# Patient Record
Sex: FEMALE | Race: WHITE | NOT HISPANIC OR LATINO | Employment: FULL TIME | URBAN - METROPOLITAN AREA
[De-identification: names, ages, dates, MRNs, and addresses within clinical notes are randomized per-mention and may not be internally consistent; named-entity substitution may affect disease eponyms.]

---

## 2018-07-12 ASSESSMENT — MIFFLIN-ST. JEOR: SCORE: 1446.11

## 2018-07-13 ENCOUNTER — SURGERY - HEALTHEAST (OUTPATIENT)
Dept: SURGERY | Facility: HOSPITAL | Age: 33
End: 2018-07-13

## 2018-07-13 ENCOUNTER — ANESTHESIA - HEALTHEAST (OUTPATIENT)
Dept: SURGERY | Facility: HOSPITAL | Age: 33
End: 2018-07-13

## 2018-12-22 ENCOUNTER — ANESTHESIA - HEALTHEAST (OUTPATIENT)
Dept: SURGERY | Facility: HOSPITAL | Age: 33
End: 2018-12-22

## 2018-12-22 ENCOUNTER — SURGERY - HEALTHEAST (OUTPATIENT)
Dept: SURGERY | Facility: HOSPITAL | Age: 33
End: 2018-12-22

## 2018-12-22 ASSESSMENT — MIFFLIN-ST. JEOR: SCORE: 1482.4

## 2019-12-16 ENCOUNTER — RECORDS - HEALTHEAST (OUTPATIENT)
Dept: ADMINISTRATIVE | Facility: OTHER | Age: 34
End: 2019-12-16

## 2019-12-18 ENCOUNTER — RECORDS - HEALTHEAST (OUTPATIENT)
Dept: ADMINISTRATIVE | Facility: OTHER | Age: 34
End: 2019-12-18

## 2020-01-07 ENCOUNTER — RECORDS - HEALTHEAST (OUTPATIENT)
Dept: ADMINISTRATIVE | Facility: OTHER | Age: 35
End: 2020-01-07

## 2020-01-11 ENCOUNTER — RECORDS - HEALTHEAST (OUTPATIENT)
Dept: ADMINISTRATIVE | Facility: OTHER | Age: 35
End: 2020-01-11

## 2021-04-24 LAB
ABO (EXTERNAL): NORMAL
HEMOGLOBIN (EXTERNAL): 12.9 G/DL (ref 11.7–15.5)
HEPATITIS B SURFACE ANTIGEN (EXTERNAL): NEGATIVE
HIV1+2 AB SERPL QL IA: NEGATIVE
PLATELET COUNT (EXTERNAL): 271 10E3/UL (ref 140–400)
RH (EXTERNAL): POSITIVE
RUBELLA ANTIBODY IGG (EXTERNAL): NORMAL
TREPONEMA PALLIDUM ANTIBODY (EXTERNAL): NONREACTIVE
VDRL (SYPHILIS) (EXTERNAL): NEGATIVE

## 2021-06-01 VITALS — HEIGHT: 64 IN | WEIGHT: 170 LBS | BODY MASS INDEX: 29.02 KG/M2

## 2021-06-02 VITALS — BODY MASS INDEX: 30.39 KG/M2 | WEIGHT: 178 LBS | HEIGHT: 64 IN

## 2021-06-16 PROBLEM — Z34.90 PREGNANT: Status: ACTIVE | Noted: 2020-01-12

## 2021-06-19 NOTE — ANESTHESIA POSTPROCEDURE EVALUATION
Patient: Ilaina Eldridge  LAPAROSCOPY WITH A TUBAL DYE STUDY,  LYSIS  OF ADHESIONS FULGURATION OF ENDOMETRIOSIS  Anesthesia type: general    Patient location: PACU  Last vitals:   Vitals:    07/13/18 1600   BP:    Pulse:    Resp: 14   Temp:    SpO2:      Post vital signs: stable  Level of consciousness: awake and responds to simple questions  Post-anesthesia pain: pain controlled  Post-anesthesia nausea and vomiting: no  Pulmonary: unassisted, return to baseline  Cardiovascular: stable and blood pressure at baseline  Hydration: adequate  Anesthetic events: no    QCDR Measures:  ASA# 11 - María-op Cardiac Arrest: ASA11B - Patient did NOT experience unanticipated cardiac arrest  ASA# 12 - María-op Mortality Rate: ASA12B - Patient did NOT die  ASA# 13 - PACU Re-Intubation Rate: ASA13B - Patient did NOT require a new airway mgmt  ASA# 10 - Composite Anes Safety: ASA10A - No serious adverse event    Additional Notes:

## 2021-06-19 NOTE — ANESTHESIA CARE TRANSFER NOTE
Last vitals:   Vitals:    07/13/18 1541   BP: 119/82   Pulse: 89   Resp: 20   Temp: 36.9  C (98.4  F)   SpO2: 100%     Volatile agents turned off, muscle relaxant reversed, 4/4 twitches with sustained tetany. Pt breathing spontaneously with adequate tidal volumes, following commands, gently suctioned oropharynx, extubated without issue. Transported by CRNA, SRNA and RN to recovery.    Patient's level of consciousness is drowsy  Spontaneous respirations: yes  Maintains airway independently: yes  Dentition unchanged: yes  Oropharynx: oropharynx clear of all foreign objects    QCDR Measures:  ASA# 20 - Surgical Safety Checklist: WHO surgical safety checklist completed prior to induction  PQRS# 430 - Adult PONV Prevention: 4558F - Pt received => 2 anti-emetic agents (different classes) preop & intraop  ASA# 8 - Peds PONV Prevention: NA - Not pediatric patient, not GA or 2 or more risk factors NOT present  PQRS# 424 - María-op Temp Management: 4559F - At least one body temp DOCUMENTED => 35.5C or 95.9F within required timeframe  PQRS# 426 - PACU Transfer Protocol: - Transfer of care checklist used  ASA# 14 - Acute Post-op Pain: ASA14B - Patient did NOT experience pain >= 7 out of 10

## 2021-06-19 NOTE — ANESTHESIA PREPROCEDURE EVALUATION
Anesthesia Evaluation      Patient summary reviewed   No history of anesthetic complications     Airway   Mallampati: II  Neck ROM: full   Pulmonary - negative ROS and normal exam    breath sounds clear to auscultation  (-) sleep apnea, not a smoker                         Cardiovascular - negative ROS  (-) murmur  Rhythm: regular  Rate: normal,    no murmur      Neuro/Psych - negative ROS     Endo/Other    (-) not pregnant     Comments: PCOS  dysmenorrhea    GI/Hepatic/Renal - negative ROS           Dental - normal exam                        Anesthesia Plan  Planned anesthetic: general endotracheal    ASA 2   Induction: intravenous   Anesthetic plan and risks discussed with: patient and spouse  Anesthesia plan special considerations: antiemetics,   Post-op plan: routine recovery

## 2021-06-22 NOTE — ANESTHESIA POSTPROCEDURE EVALUATION
Patient: Iliana Eldridge  DILATION AND CURETTAGE, UTERUS, USING SUCTION  Anesthesia type: MAC    Patient location: PACU  Last vitals:   Vitals:    12/22/18 0930   BP: 125/72   Pulse: 75   Resp: 16   Temp:    SpO2: 98%     Post vital signs: stable  Level of consciousness: awake and responds to simple questions  Post-anesthesia pain: pain controlled  Post-anesthesia nausea and vomiting: no  Pulmonary: unassisted, return to baseline  Cardiovascular: stable and blood pressure at baseline  Hydration: adequate  Anesthetic events: no    QCDR Measures:  ASA# 11 - María-op Cardiac Arrest: ASA11B - Patient did NOT experience unanticipated cardiac arrest  ASA# 12 - María-op Mortality Rate: ASA12B - Patient did NOT die  ASA# 13 - PACU Re-Intubation Rate: ASA13B - Patient did NOT require a new airway mgmt  ASA# 10 - Composite Anes Safety: ASA10A - No serious adverse event    Additional Notes:

## 2021-06-22 NOTE — ANESTHESIA CARE TRANSFER NOTE
Last vitals:   Vitals:    12/22/18 0844   BP: 109/55   Pulse: 94   Resp: 16   Temp: 37.4  C (99.4  F)   SpO2: 98%     Patient's level of consciousness is awake  Spontaneous respirations: yes  Maintains airway independently: yes  Dentition unchanged: yes  Oropharynx: oropharynx clear of all foreign objects    QCDR Measures:  ASA# 20 - Surgical Safety Checklist: WHO surgical safety checklist completed prior to induction    PQRS# 430 - Adult PONV Prevention: 4558F - Pt received => 2 anti-emetic agents (different classes) preop & intraop  ASA# 8 - Peds PONV Prevention: NA - Not pediatric patient, not GA or 2 or more risk factors NOT present  PQRS# 424 - María-op Temp Management: 4559F - At least one body temp DOCUMENTED => 35.5C or 95.9F within required timeframe  PQRS# 426 - PACU Transfer Protocol: - Transfer of care checklist used  ASA# 14 - Acute Post-op Pain: ASA14B - Patient did NOT experience pain >= 7 out of 10

## 2021-06-22 NOTE — ANESTHESIA PREPROCEDURE EVALUATION
Anesthesia Evaluation      Patient summary reviewed   No history of anesthetic complications     Airway   Mallampati: II  Neck ROM: full   Pulmonary - negative ROS and normal exam    breath sounds clear to auscultation  (-) sleep apnea, not a smoker                         Cardiovascular - negative ROS  (-) murmur  Rhythm: regular  Rate: normal,    no murmur      Neuro/Psych - negative ROS     Endo/Other    (-) not pregnant     Comments: PCOS  dysmenorrhea    GI/Hepatic/Renal - negative ROS           Dental - normal exam                          Anesthesia Plan  Planned anesthetic: MAC    ASA 2 - emergent   Induction: intravenous   Anesthetic plan and risks discussed with: patient  Anesthesia plan special considerations: antiemetics,   Post-op plan: routine recovery

## 2021-07-03 NOTE — ADDENDUM NOTE
Addendum Note by Tigist Wharton MD at 7/13/2018  4:35 PM     Author: Tigist Wharton MD Service: -- Author Type: Physician    Filed: 7/13/2018  4:35 PM Date of Service: 7/13/2018  4:35 PM Status: Signed    : Tigist Wharton MD (Physician)       Addendum  created 07/13/18 1635 by Tigist Wharton MD    Sign clinical note

## 2021-07-03 NOTE — ADDENDUM NOTE
Addendum Note by Lizzy Smith CRNA at 12/22/2018 10:35 AM     Author: Lizzy Smith CRNA Service: -- Author Type: Nurse Anesthetist    Filed: 12/22/2018 10:35 AM Date of Service: 12/22/2018 10:35 AM Status: Signed    : Lizzy Smith CRNA (Nurse Anesthetist)       Addendum  created 12/22/18 1035 by Lizzy Smith CRNA    Intraprocedure Flowsheets edited

## 2021-07-03 NOTE — ADDENDUM NOTE
Addendum Note by Lizzy Smith CRNA at 12/22/2018 10:34 AM     Author: Lizzy Smith CRNA Service: -- Author Type: Nurse Anesthetist    Filed: 12/22/2018 10:34 AM Date of Service: 12/22/2018 10:34 AM Status: Signed    : Lizzy Smith CRNA (Nurse Anesthetist)       Addendum  created 12/22/18 1034 by Lizzy Smith CRNA    Intraprocedure Flowsheets edited

## 2021-08-23 ENCOUNTER — TRANSFERRED RECORDS (OUTPATIENT)
Dept: HEALTH INFORMATION MANAGEMENT | Facility: CLINIC | Age: 36
End: 2021-08-23
Payer: COMMERCIAL

## 2021-10-22 LAB — GROUP B STREPTOCOCCUS (EXTERNAL): POSITIVE

## 2021-10-28 ENCOUNTER — TRANSFERRED RECORDS (OUTPATIENT)
Dept: HEALTH INFORMATION MANAGEMENT | Facility: CLINIC | Age: 36
End: 2021-10-28

## 2021-11-19 ENCOUNTER — HOSPITAL ENCOUNTER (INPATIENT)
Facility: HOSPITAL | Age: 36
LOS: 3 days | Discharge: HOME | End: 2021-11-22
Attending: OBSTETRICS & GYNECOLOGY | Admitting: OBSTETRICS & GYNECOLOGY
Payer: COMMERCIAL

## 2021-11-19 PROBLEM — Z34.90 PREGNANCY: Status: ACTIVE | Noted: 2021-11-19

## 2021-11-19 PROCEDURE — 120N000001 HC R&B MED SURG/OB

## 2021-11-19 RX ORDER — OXYTOCIN 10 [USP'U]/ML
10 INJECTION, SOLUTION INTRAMUSCULAR; INTRAVENOUS
Status: DISCONTINUED | OUTPATIENT
Start: 2021-11-19 | End: 2021-11-22 | Stop reason: HOSPADM

## 2021-11-19 RX ORDER — OXYTOCIN/0.9 % SODIUM CHLORIDE 30/500 ML
340 PLASTIC BAG, INJECTION (ML) INTRAVENOUS CONTINUOUS PRN
Status: DISCONTINUED | OUTPATIENT
Start: 2021-11-19 | End: 2021-11-20 | Stop reason: HOSPADM

## 2021-11-19 RX ORDER — OXYTOCIN/0.9 % SODIUM CHLORIDE 30/500 ML
100-340 PLASTIC BAG, INJECTION (ML) INTRAVENOUS CONTINUOUS PRN
Status: DISCONTINUED | OUTPATIENT
Start: 2021-11-19 | End: 2021-11-22 | Stop reason: HOSPADM

## 2021-11-19 RX ORDER — ONDANSETRON 4 MG/1
4 TABLET, ORALLY DISINTEGRATING ORAL EVERY 6 HOURS PRN
Status: DISCONTINUED | OUTPATIENT
Start: 2021-11-19 | End: 2021-11-20 | Stop reason: HOSPADM

## 2021-11-19 RX ORDER — CARBOPROST TROMETHAMINE 250 UG/ML
250 INJECTION, SOLUTION INTRAMUSCULAR
Status: DISCONTINUED | OUTPATIENT
Start: 2021-11-19 | End: 2021-11-20 | Stop reason: HOSPADM

## 2021-11-19 RX ORDER — ONDANSETRON 2 MG/ML
4 INJECTION INTRAMUSCULAR; INTRAVENOUS EVERY 6 HOURS PRN
Status: DISCONTINUED | OUTPATIENT
Start: 2021-11-19 | End: 2021-11-20 | Stop reason: HOSPADM

## 2021-11-19 RX ORDER — PENICILLIN G POTASSIUM 5000000 [IU]/1
5 INJECTION, POWDER, FOR SOLUTION INTRAMUSCULAR; INTRAVENOUS ONCE
Status: COMPLETED | OUTPATIENT
Start: 2021-11-20 | End: 2021-11-20

## 2021-11-19 RX ORDER — NALOXONE HYDROCHLORIDE 0.4 MG/ML
0.4 INJECTION, SOLUTION INTRAMUSCULAR; INTRAVENOUS; SUBCUTANEOUS
Status: DISCONTINUED | OUTPATIENT
Start: 2021-11-19 | End: 2021-11-20 | Stop reason: HOSPADM

## 2021-11-19 RX ORDER — NALOXONE HYDROCHLORIDE 0.4 MG/ML
0.2 INJECTION, SOLUTION INTRAMUSCULAR; INTRAVENOUS; SUBCUTANEOUS
Status: DISCONTINUED | OUTPATIENT
Start: 2021-11-19 | End: 2021-11-20 | Stop reason: HOSPADM

## 2021-11-19 RX ORDER — LIDOCAINE 40 MG/G
CREAM TOPICAL
Status: DISCONTINUED | OUTPATIENT
Start: 2021-11-19 | End: 2021-11-20 | Stop reason: HOSPADM

## 2021-11-19 RX ORDER — PENICILLIN G 3000000 [IU]/50ML
3 INJECTION, SOLUTION INTRAVENOUS EVERY 4 HOURS
Status: DISCONTINUED | OUTPATIENT
Start: 2021-11-20 | End: 2021-11-20 | Stop reason: HOSPADM

## 2021-11-19 RX ORDER — IBUPROFEN 600 MG/1
600 TABLET, FILM COATED ORAL
Status: DISCONTINUED | OUTPATIENT
Start: 2021-11-19 | End: 2021-11-22 | Stop reason: HOSPADM

## 2021-11-19 RX ORDER — KETOROLAC TROMETHAMINE 30 MG/ML
30 INJECTION, SOLUTION INTRAMUSCULAR; INTRAVENOUS
Status: DISCONTINUED | OUTPATIENT
Start: 2021-11-19 | End: 2021-11-22 | Stop reason: HOSPADM

## 2021-11-19 RX ORDER — OXYTOCIN 10 [USP'U]/ML
10 INJECTION, SOLUTION INTRAMUSCULAR; INTRAVENOUS
Status: DISCONTINUED | OUTPATIENT
Start: 2021-11-19 | End: 2021-11-20 | Stop reason: HOSPADM

## 2021-11-19 RX ORDER — FENTANYL CITRATE 50 UG/ML
50-100 INJECTION, SOLUTION INTRAMUSCULAR; INTRAVENOUS
Status: DISCONTINUED | OUTPATIENT
Start: 2021-11-19 | End: 2021-11-20 | Stop reason: HOSPADM

## 2021-11-19 ASSESSMENT — MIFFLIN-ST. JEOR: SCORE: 1574.93

## 2021-11-20 LAB
ABO/RH(D): NORMAL
ANTIBODY SCREEN: NEGATIVE
ERYTHROCYTE [DISTWIDTH] IN BLOOD BY AUTOMATED COUNT: 13.1 % (ref 10–15)
HCT VFR BLD AUTO: 38.9 % (ref 35–47)
HGB BLD-MCNC: 13.5 G/DL (ref 11.7–15.7)
MCH RBC QN AUTO: 32.1 PG (ref 26.5–33)
MCHC RBC AUTO-ENTMCNC: 34.7 G/DL (ref 31.5–36.5)
MCV RBC AUTO: 92 FL (ref 78–100)
PLATELET # BLD AUTO: 170 10E3/UL (ref 150–450)
RBC # BLD AUTO: 4.21 10E6/UL (ref 3.8–5.2)
SARS-COV-2 RNA RESP QL NAA+PROBE: NEGATIVE
SPECIMEN EXPIRATION DATE: NORMAL
WBC # BLD AUTO: 11.2 10E3/UL (ref 4–11)

## 2021-11-20 PROCEDURE — 87635 SARS-COV-2 COVID-19 AMP PRB: CPT | Performed by: OBSTETRICS & GYNECOLOGY

## 2021-11-20 PROCEDURE — 36415 COLL VENOUS BLD VENIPUNCTURE: CPT | Performed by: OBSTETRICS & GYNECOLOGY

## 2021-11-20 PROCEDURE — 722N000001 HC LABOR CARE VAGINAL DELIVERY SINGLE

## 2021-11-20 PROCEDURE — 120N000001 HC R&B MED SURG/OB

## 2021-11-20 PROCEDURE — 250N000011 HC RX IP 250 OP 636: Performed by: OBSTETRICS & GYNECOLOGY

## 2021-11-20 PROCEDURE — 258N000003 HC RX IP 258 OP 636: Performed by: OBSTETRICS & GYNECOLOGY

## 2021-11-20 PROCEDURE — 85027 COMPLETE CBC AUTOMATED: CPT | Performed by: OBSTETRICS & GYNECOLOGY

## 2021-11-20 PROCEDURE — 86900 BLOOD TYPING SEROLOGIC ABO: CPT | Performed by: OBSTETRICS & GYNECOLOGY

## 2021-11-20 PROCEDURE — 250N000009 HC RX 250: Performed by: OBSTETRICS & GYNECOLOGY

## 2021-11-20 PROCEDURE — 86780 TREPONEMA PALLIDUM: CPT | Performed by: OBSTETRICS & GYNECOLOGY

## 2021-11-20 PROCEDURE — 250N000013 HC RX MED GY IP 250 OP 250 PS 637: Performed by: OBSTETRICS & GYNECOLOGY

## 2021-11-20 PROCEDURE — 0KQM0ZZ REPAIR PERINEUM MUSCLE, OPEN APPROACH: ICD-10-PCS | Performed by: OBSTETRICS & GYNECOLOGY

## 2021-11-20 RX ORDER — ACYCLOVIR 400 MG/1
TABLET ORAL
COMMUNITY
Start: 2021-10-21

## 2021-11-20 RX ORDER — MODIFIED LANOLIN
OINTMENT (GRAM) TOPICAL
Status: DISCONTINUED | OUTPATIENT
Start: 2021-11-20 | End: 2021-11-22 | Stop reason: HOSPADM

## 2021-11-20 RX ORDER — MISOPROSTOL 200 UG/1
TABLET ORAL
Status: DISPENSED
Start: 2021-11-20 | End: 2021-11-20

## 2021-11-20 RX ORDER — LIDOCAINE HYDROCHLORIDE 10 MG/ML
INJECTION, SOLUTION EPIDURAL; INFILTRATION; INTRACAUDAL; PERINEURAL
Status: DISPENSED
Start: 2021-11-20 | End: 2021-11-20

## 2021-11-20 RX ORDER — CHLORAL HYDRATE 500 MG
1 CAPSULE ORAL DAILY
COMMUNITY

## 2021-11-20 RX ORDER — IBUPROFEN 800 MG/1
800 TABLET, FILM COATED ORAL EVERY 6 HOURS PRN
Status: DISCONTINUED | OUTPATIENT
Start: 2021-11-20 | End: 2021-11-22 | Stop reason: HOSPADM

## 2021-11-20 RX ORDER — HYDROCORTISONE 2.5 %
CREAM (GRAM) TOPICAL 3 TIMES DAILY PRN
Status: DISCONTINUED | OUTPATIENT
Start: 2021-11-20 | End: 2021-11-22 | Stop reason: HOSPADM

## 2021-11-20 RX ORDER — HEPARIN SODIUM 5000 [USP'U]/ML
INJECTION, SOLUTION INTRAVENOUS; SUBCUTANEOUS
Status: ON HOLD | COMMUNITY
Start: 2021-10-21 | End: 2021-11-22

## 2021-11-20 RX ORDER — DOCUSATE SODIUM 100 MG/1
100 CAPSULE, LIQUID FILLED ORAL DAILY
Status: DISCONTINUED | OUTPATIENT
Start: 2021-11-20 | End: 2021-11-22 | Stop reason: HOSPADM

## 2021-11-20 RX ORDER — OXYTOCIN 10 [USP'U]/ML
INJECTION, SOLUTION INTRAMUSCULAR; INTRAVENOUS
Status: DISPENSED
Start: 2021-11-20 | End: 2021-11-20

## 2021-11-20 RX ORDER — BISACODYL 10 MG
10 SUPPOSITORY, RECTAL RECTAL DAILY PRN
Status: DISCONTINUED | OUTPATIENT
Start: 2021-11-20 | End: 2021-11-22 | Stop reason: HOSPADM

## 2021-11-20 RX ORDER — LIDOCAINE HYDROCHLORIDE 20 MG/ML
SOLUTION OROPHARYNGEAL
Status: DISPENSED
Start: 2021-11-20 | End: 2021-11-20

## 2021-11-20 RX ORDER — ACETAMINOPHEN 325 MG/1
650 TABLET ORAL EVERY 4 HOURS PRN
Status: DISCONTINUED | OUTPATIENT
Start: 2021-11-20 | End: 2021-11-22 | Stop reason: HOSPADM

## 2021-11-20 RX ADMIN — IBUPROFEN 800 MG: 800 TABLET, FILM COATED ORAL at 09:41

## 2021-11-20 RX ADMIN — LIDOCAINE HYDROCHLORIDE 20 ML: 10 INJECTION, SOLUTION EPIDURAL; INFILTRATION; INTRACAUDAL; PERINEURAL at 01:41

## 2021-11-20 RX ADMIN — PENICILLIN G POTASSIUM 5 MILLION UNITS: 5000000 POWDER, FOR SOLUTION INTRAMUSCULAR; INTRAPLEURAL; INTRATHECAL; INTRAVENOUS at 00:10

## 2021-11-20 RX ADMIN — DOCUSATE SODIUM 100 MG: 100 CAPSULE, LIQUID FILLED ORAL at 09:41

## 2021-11-20 RX ADMIN — IBUPROFEN 800 MG: 800 TABLET, FILM COATED ORAL at 15:12

## 2021-11-20 RX ADMIN — BENZOCAINE AND LEVOMENTHOL: 200; 5 SPRAY TOPICAL at 04:18

## 2021-11-20 RX ADMIN — IBUPROFEN 800 MG: 800 TABLET, FILM COATED ORAL at 21:32

## 2021-11-20 RX ADMIN — SODIUM CHLORIDE, POTASSIUM CHLORIDE, SODIUM LACTATE AND CALCIUM CHLORIDE 1000 ML: 600; 310; 30; 20 INJECTION, SOLUTION INTRAVENOUS at 00:07

## 2021-11-20 RX ADMIN — Medication: at 04:19

## 2021-11-20 RX ADMIN — KETOROLAC TROMETHAMINE 30 MG: 30 INJECTION, SOLUTION INTRAMUSCULAR at 03:25

## 2021-11-20 ASSESSMENT — ACTIVITIES OF DAILY LIVING (ADL)
HEARING_DIFFICULTY_OR_DEAF: NO
WEAR_GLASSES_OR_BLIND: YES
TOILETING_ISSUES: NO
WALKING_OR_CLIMBING_STAIRS_DIFFICULTY: NO
DRESSING/BATHING_DIFFICULTY: NO
FALL_HISTORY_WITHIN_LAST_SIX_MONTHS: NO
DOING_ERRANDS_INDEPENDENTLY_DIFFICULTY: NO
PATIENT_/_FAMILY_COMMUNICATION_STYLE: SPOKEN LANGUAGE (ENGLISH OR BILINGUAL)
DIFFICULTY_COMMUNICATING: NO
DIFFICULTY_EATING/SWALLOWING: NO
CONCENTRATING,_REMEMBERING_OR_MAKING_DECISIONS_DIFFICULTY: NO

## 2021-11-20 NOTE — PLAN OF CARE
Problem: Adult Inpatient Plan of Care  Goal: Plan of Care Review  Outcome: Improving  Goal: Patient-Specific Goal (Individualized)  Outcome: Improving  Goal: Absence of Hospital-Acquired Illness or Injury  Outcome: Improving  Goal: Optimal Comfort and Wellbeing  Outcome: Improving  Goal: Readiness for Transition of Care  Outcome: Improving  Flowsheets (Taken 11/20/2021 0015)  Transportation Anticipated: none  Intervention: Mutually Develop Transition Plan  Recent Flowsheet Documentation  Taken 11/20/2021 0015 by Mariah Matthews, RN  Transportation Anticipated: none  Patient/Family Anticipated Services at Transition: none  Patient/Family Anticipates Transition to: home  Taken 11/20/2021 0000 by Mariah Matthews, RN  Equipment Currently Used at Home: none

## 2021-11-20 NOTE — PLAN OF CARE
Problem: Pain (Postpartum Vaginal Delivery)  Goal: Acceptable Pain Control  Outcome: Improving  Intervention: Prevent or Manage Pain  Recent Flowsheet Documentation  Taken 11/20/2021 0800 by Emeli Cheney RN  Complementary Therapy: aromatherapy utilized     VSS.  PP assessments WDL.  Ibuprofen for discomfort.  Independent with self and infant cares.  Encouraged to bring infant to breast ALD q 2-3 hours even if its just an attempt.  Verbalized understanding.

## 2021-11-20 NOTE — PLAN OF CARE
Problem: Adult Inpatient Plan of Care  Goal: Plan of Care Review  11/20/2021 0101 by Mariah Matthews RN  Outcome: Improving  11/20/2021 0059 by Mariah Matthews RN  Outcome: Improving  Goal: Patient-Specific Goal (Individualized)  11/20/2021 0101 by Mariah Matthews RN  Outcome: Improving  11/20/2021 0059 by Mariah Matthews RN  Outcome: Improving  Goal: Absence of Hospital-Acquired Illness or Injury  11/20/2021 0101 by Mariah Matthews RN  Outcome: Improving  11/20/2021 0059 by Mariah Matthews RN  Outcome: Improving  Goal: Optimal Comfort and Wellbeing  11/20/2021 0101 by Mariah Matthews RN  Outcome: Improving  11/20/2021 0059 by Mariah Matthews RN  Outcome: Improving  Goal: Readiness for Transition of Care  11/20/2021 0101 by Mariah Matthews RN  Outcome: Improving  11/20/2021 0059 by Mariah Matthews RN  Outcome: Improving  Flowsheets (Taken 11/20/2021 0015)  Transportation Anticipated: none  Intervention: Mutually Develop Transition Plan  Recent Flowsheet Documentation  Taken 11/20/2021 0015 by Mariah Matthews RN  Transportation Anticipated: none  Patient/Family Anticipated Services at Transition: none  Patient/Family Anticipates Transition to: home  Taken 11/20/2021 0000 by Mariah Matthews RN  Equipment Currently Used at Home: none     Problem: Bleeding (Labor)  Goal: Hemostasis  Outcome: Improving     Problem: Change in Fetal Wellbeing (Labor)  Goal: Stable Fetal Wellbeing  Outcome: Improving     Problem: Delayed Labor Progression (Labor)  Goal: Effective Progression to Delivery  Outcome: Improving     Problem: Infection (Labor)  Goal: Absence of Infection Signs and Symptoms  Outcome: Improving     Problem: Labor Pain (Labor)  Goal: Acceptable Pain Control  Outcome: Improving     Problem: Uterine Tachysystole (Labor)  Goal: Normal Uterine Contraction Pattern  Outcome: Improving

## 2021-11-20 NOTE — LACTATION NOTE
This note was copied from a baby's chart.  Lactation consultant to patient room to assess breastfeeding (history, goals, & current experience). 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 Breast fed her first children, now 6 ys and 2 yrs, for 2 years and 15 months respectively. States this baby has been very sleepy since delivery.    Reviewed benefit of skin to skin prior to feeding to help get baby ready for feeding, importance of feeding baby on early hunger cues, and breastfeeding 8-12 times in 24 hours for optimal infant nutrition and hydration as well as for building an optimal milk supply.  Education given regarding importance of optimal positioning for deep, comfortable latch and effective milk transfer.     Instructed on hand expression and breast compression. Anticipatory guidance given on cluster feeding, engorgement, and pumping. Reviewed online and outpatient lactation resources for breastfeeding help after discharge.     Answered questions and gave encouragement.

## 2021-11-20 NOTE — H&P
11-19-21 Iliana Eldridge  [birth year]    CC: Leaking fluid and labor    HPI: Pt is a 36 y.o. , spontaneous loss 3 female at 39w 6d who presents with contractions q3m and leaking fluid. She is + for factor 5 leiden and has recurrent pregnancy loss and was on Lovenox 40mg subcutaneous daily, switched to heparin 5000U subcutaneous bid at 36 weeks. Her last heparin was 17 hours ago. She had a C/S for a breech in  and a successful  in . She requests .    PMH:She denies medical problems except infertility with l'scope . She has a PH of genital herpes as well and take acyclovir 400mg bid for suppression. She has not drug allergies and also takes ASA 81mg daily.    PFSH: She is  and does not smoke and does not drink alcohol when pregnant.    ROS: Non-contributory.    PE:VS Not yet charted.  General appearance: WD, WN white female with a gravid uterus and contraction q3m.  Lungs; Clear.  Heart: NSR without murmus.  Abd: Gravid uterus with FH 40 cm at her last PNV. FHR tracing appears to be category 2 with limited reactivity and mild decelerations with contractions.  Cx: 4-5cm, 70%, -1, MP, soft.  Gross ROM with clear fluid.    A: 1. IUP at 39w 6d  2. Previous C/S and previous . Pt desires .  3. AMA  4. Factor 5 leiden  5. PH HSV, on suppression with acyclovir    P: Admit and observe for progress in labor. I expect spontaneous vaginal birth after C/S. CBC. T&S. IV access.    Atul Aly MD    A: 1.

## 2021-11-20 NOTE — PROGRESS NOTES
2312: patient admitted into Room 26 to rule out labor. She states contractions started around 9 pm this evening and her water broke at 10:55 pm. Oriented to room and equipment. Hooked up to [facility] to evaluate.    2335: Dr. Aly notified of patient's arrival to the hospital, grossly ruptured membranes and GBS positive status. He will come to check patient and put orders in.    2340: Digital vaginal exam by Dr. Aly 4-5 cm/70%/-1.    0010: IV started, PCN 5 MU started.    0100: Patient coping well, fetal hearttone category 1 contractions getting closer and stronger.    0123: Digital vaginal exam by Dr. Aly 6/100%/-1 patient complaining of rectal pressure. Encouraged to not push.    0130: Dr. Anderson called back to room as patient is pushing involuntarily.    0131: Dr. Aly and Rissa Yost in room for delivery. .    0134: Spontaneous delivery of a viable baby girl.

## 2021-11-21 LAB — T PALLIDUM AB SER QL: NONREACTIVE

## 2021-11-21 PROCEDURE — 250N000013 HC RX MED GY IP 250 OP 250 PS 637: Performed by: OBSTETRICS & GYNECOLOGY

## 2021-11-21 PROCEDURE — 120N000001 HC R&B MED SURG/OB

## 2021-11-21 RX ADMIN — IBUPROFEN 800 MG: 800 TABLET, FILM COATED ORAL at 22:20

## 2021-11-21 RX ADMIN — IBUPROFEN 800 MG: 800 TABLET, FILM COATED ORAL at 09:43

## 2021-11-21 RX ADMIN — DOCUSATE SODIUM 100 MG: 100 CAPSULE, LIQUID FILLED ORAL at 09:42

## 2021-11-21 RX ADMIN — IBUPROFEN 800 MG: 800 TABLET, FILM COATED ORAL at 16:16

## 2021-11-21 RX ADMIN — IBUPROFEN 800 MG: 800 TABLET, FILM COATED ORAL at 03:12

## 2021-11-21 NOTE — PROGRESS NOTES
11-21-21    S: Feels well.    O: VSS Afebrile    A: PPD #1, doing well.    P: Supportive care.    Atul Aly MD

## 2021-11-21 NOTE — PLAN OF CARE
Problem: Bleeding (Postpartum Vaginal Delivery)  Goal: Hemostasis  Outcome: Improving     Problem: Infection (Postpartum Vaginal Delivery)  Goal: Absence of Infection Signs and Symptoms  Outcome: Improving     Problem: Pain (Postpartum Vaginal Delivery)  Goal: Acceptable Pain Control  Outcome: Improving  Intervention: Prevent or Manage Pain  Recent Flowsheet Documentation  Taken 11/20/2021 1630 by Yeni Bonilla RN  Complementary Therapy: aromatherapy utilized     Problem: Urinary Retention (Postpartum Vaginal Delivery)  Goal: Effective Urinary Elimination  Outcome: Improving     Problem: Breastfeeding  Goal: Effective Breastfeeding  Outcome: Improving  Intervention: Promote Effective Breastfeeding  Recent Flowsheet Documentation  Taken 11/20/2021 1630 by Yeni Bonilla RN  Parent/Child Attachment Promotion:   caring behavior modeled   cue recognition promoted   interaction encouraged   parent/caregiver presence encouraged  Intervention: Support Exclusive Breastfeeding Success  Recent Flowsheet Documentation  Taken 11/20/2021 1630 by Yeni Bonilla RN  Supportive Measures:   active listening utilized   goal-setting facilitated   positive reinforcement provided   self-care encouraged   verbalization of feelings encouraged     VSS, FFU with no clots. Ambulating and voiding independently. Continues to attempt breastfeeding, baby much more alert and responsive after her 1700 feeding. [place] and attentive partner at bedside.

## 2021-11-21 NOTE — PLAN OF CARE
Problem: Breastfeeding  Goal: Effective Breastfeeding  11/21/2021 0207 by Loretta Gonzalez RN  Outcome: Improving  11/21/2021 0206 by Loretta Gonzalez RN  Outcome: Improving  11/21/2021 0206 by Loretta Gonzalez RN  Outcome: Improving  Intervention: Support Exclusive Breastfeeding Success  Recent Flowsheet Documentation  Taken 11/21/2021 0100 by Loretta Gonzalez RN  Supportive Measures:   active listening utilized   goal-setting facilitated   guided imagery facilitated   positive reinforcement provided   problem-solving facilitated   Pt vitals are stable, Mom is exclusively breastfeeding independently well. She is taking Motrin for uterine cramping prn Loretta Gonzalez RN

## 2021-11-21 NOTE — L&D DELIVERY NOTE
21 Iliana Eldridge  83    Delivery Note    Pt is a 36 y.o. G6, now P3 spontaneous loss 3 white female who presented in the late evening hours of 21 at 39w 6d in active labor with [facility]. Her history was significant for a C/S for a breech with her first pregnancy and a successful  with her second. She requested . She tested + for GBS and was given PCN and was + for factor 5 leiden with recurrent pregnancy loss and was on Lovenox to 36 weeks and Heparin 5000U subcutaneous bid, last dose about 18 hours prior to presentation. She took Acyclovir to suppress HSV. She made rapid progress through labor going from 6cm to delivery in three contractions with birth of a 6-15 female with APGAR scores of 8 and 9. The placenta followed spontaneously intact. A second degree perineal laceration was repaired with 3-0 Vicryl suture. EBL was 200cc's. I expect routine PP care.    Atul Aly MD

## 2021-11-21 NOTE — PLAN OF CARE
Spoke with Dr. Aly regarding pt's Factor V and he does not want to any anticoagulation treatments at this time.

## 2021-11-22 VITALS
OXYGEN SATURATION: 97 % | SYSTOLIC BLOOD PRESSURE: 115 MMHG | RESPIRATION RATE: 18 BRPM | HEART RATE: 76 BPM | WEIGHT: 196 LBS | TEMPERATURE: 98.2 F | HEIGHT: 65 IN | BODY MASS INDEX: 32.65 KG/M2 | DIASTOLIC BLOOD PRESSURE: 72 MMHG

## 2021-11-22 PROCEDURE — 250N000013 HC RX MED GY IP 250 OP 250 PS 637: Performed by: OBSTETRICS & GYNECOLOGY

## 2021-11-22 RX ADMIN — IBUPROFEN 800 MG: 800 TABLET, FILM COATED ORAL at 04:10

## 2021-11-22 NOTE — PLAN OF CARE
Problem: Pain (Postpartum Vaginal Delivery)  Goal: Acceptable Pain Control  Outcome: Improving   Mom pain well controlled with NSAIDS.Pt is independent with self and baby cares, meeting discharge goals. Loretta Gonzalez RN

## 2021-11-22 NOTE — PROGRESS NOTES
11-22-21    S: Feels well.    O: VSS Afebrile    A: PPD #2, doing well.    P: Discharge home. Routine instructions. Continue PNV. A Pos. Immune to rubella.    Atul Aly MD

## 2021-11-22 NOTE — PLAN OF CARE
Going-home instructions after a vaginal birth.    Congratulations on the birth of your baby girl!    Please call me if your bleeding is bright red more than a pad an hour and doesn't seem to be slowing down. Please watch for infection by taking your temperature every evening the first week. Please call me if it goes above 100 degrees. If you have stitches, soaking in a warm, soapy bathtub once or twice a day the first week you are home will keep the area clean and help it heal.     You may use Tylenol or Ibuprofen for mild pain. Both are available over the counter. Lanolin for breast feeding, Nupercainal and Tucks for soreness, Colace for a stool softener, and a variety of laxatives are all available over the counter and may be used if needed.     For questions, please call the office at [phone] during regular business hours. My after-hours emergency number is [phone].     Please make an appointment to see me in six weeks.         Atul Aly MD

## 2021-11-22 NOTE — PLAN OF CARE
Iliana's VSS.  She's independent with self and baby cares.  She's taking Ibuprofen for intermittent cramping and perineal pain with good results.    Problem: Breastfeeding  Goal: Effective Breastfeeding  Intervention: Promote Breast Care and Comfort  Recent Flowsheet Documentation  Taken 11/21/2021 1616 by Lauren Brasher RN  Breast Care: hand expression utilized  Intervention: Promote Effective Breastfeeding  Recent Flowsheet Documentation  Taken 11/21/2021 1616 by Lauren Brasher RN  Breastfeeding Assistance:   feeding on demand promoted   feeding cue recognition promoted  Parent/Child Attachment Promotion:   positive reinforcement provided   cue recognition promoted  Intervention: Support Exclusive Breastfeeding Success  Recent Flowsheet Documentation  Taken 11/21/2021 1616 by Lauren Brasher RN  Supportive Measures:   counseling provided   positive reinforcement provided   decision-making supported   Breastfeeding is going well; her milk is in and baby's swallowing audibly at feedings.  Her nipples are comfortable; breasts feeling full, but declines pumping here at hospital.  She plans to pump later when she goes back to work.

## 2024-07-21 ENCOUNTER — HEALTH MAINTENANCE LETTER (OUTPATIENT)
Age: 39
End: 2024-07-21

## 2025-01-10 ENCOUNTER — ANCILLARY PROCEDURE (OUTPATIENT)
Dept: MAMMOGRAPHY | Facility: CLINIC | Age: 40
End: 2025-01-10
Payer: COMMERCIAL

## 2025-01-10 DIAGNOSIS — N63.10 LUMP OF BREAST, RIGHT: ICD-10-CM

## 2025-01-10 DIAGNOSIS — N63.20 LEFT BREAST LUMP: ICD-10-CM

## 2025-01-10 PROCEDURE — 76642 ULTRASOUND BREAST LIMITED: CPT | Mod: LT

## 2025-01-10 PROCEDURE — 77066 DX MAMMO INCL CAD BI: CPT

## 2025-01-15 ENCOUNTER — VIRTUAL VISIT (OUTPATIENT)
Dept: FAMILY MEDICINE | Facility: CLINIC | Age: 40
End: 2025-01-15
Payer: COMMERCIAL

## 2025-01-15 DIAGNOSIS — U07.1 INFECTION DUE TO 2019 NOVEL CORONAVIRUS: Primary | ICD-10-CM

## 2025-01-15 PROCEDURE — 98001 SYNCH AUDIO-VIDEO NEW LOW 30: CPT | Performed by: FAMILY MEDICINE

## 2025-01-15 RX ORDER — VALACYCLOVIR HYDROCHLORIDE 500 MG/1
500 TABLET, FILM COATED ORAL 2 TIMES DAILY
COMMUNITY

## 2025-01-15 NOTE — PROGRESS NOTES
Shavonne is a 39 year old who is being evaluated via a billable video visit.    How would you like to obtain your AVS? MyChart  If the video visit is dropped, the invitation should be resent by: Text to cell phone: [phone]  Will anyone else be joining your video visit? No      Assessment & Plan     Infection due to 2019 novel coronavirus  Patient tested positive for COVID  Patient's collage of symptoms with fevers suspect that she likely also has influenza A  Other sick contacts at home with the children having similar symptoms  She is outside the window for Tamiflu was discussed with her ibuprofen and Tylenol use with hydration  She should be seen in person if symptoms are not improving in the next couple days or if they are worsening                  Subjective   Shavonne is a 39 year old, presenting for the following health issues:  Nasal Congestion (Nasal congestion, sore throat, fever X3 days, tested positive for covid this morning )  Patient is being seen via video visit as a new patient for clinic.  39-year-old female that has been having symptoms of congestion head pressure fevers and sore throat for greater than 3 days in duration.  No other sick contacts at home with her children having similar and other symptoms as well.  This morning she tested positive for COVID but is concerned that there is other things going on.  Patient has been having fevers from 99-1 02-discussed with her to keep temperature down with ibuprofen and Tylenol use and keep well-hydrated.  Being more than 3 days out she is not a candidate for Tamiflu.  We offered a lab visit for swabbing to assess for RSV COVID and influenza-she is not interested in Paxlovid so declines nasal swab.  Patient will continue with home measures.  Recommended Mucinex to help with drainage to help alleviate hopefully some ear pain.  Discussed if symptoms not improving she should be seen in person.      1/15/2025    12:56 PM   Additional Questions   Roomed by  Monique DIEHL CMA     History of Present Illness       Reason for visit:  Fever, body aches, nasal congestion, ear pain, cough  Symptom onset:  1-3 days ago  Symptoms include:  Congestion,  fevers 102+, body aches, cough, ear pain, sore throat  Symptom intensity:  Moderate  Symptom progression:  Worsening  Had these symptoms before:  No  What makes it better:  Rest, hot epsom salt baths, OTC med at night to sleep   She is taking medications regularly.                   Objective           Vitals:  No vitals were obtained today due to virtual visit.    Physical Exam   GENERAL: alert and no distress  EYES: Eyes grossly normal to inspection.  No discharge or erythema, or obvious scleral/conjunctival abnormalities.  RESP: No audible wheeze, cough, or visible cyanosis.    SKIN: Visible skin clear. No significant rash, abnormal pigmentation or lesions.  NEURO: Cranial nerves grossly intact.  Mentation and speech appropriate for age.  PSYCH: Appropriate affect, tone, and pace of words          Video-Visit Details    Type of service:  Video Visit   Originating Location (pt. Location): Home    Distant Location (provider location):  On-site  Platform used for Video Visit: Ainsley  Signed Electronically by: Manuel Spence MD

## 2025-01-22 ENCOUNTER — LAB REQUISITION (OUTPATIENT)
Dept: LAB | Facility: CLINIC | Age: 40
End: 2025-01-22
Payer: COMMERCIAL

## 2025-01-22 DIAGNOSIS — Z01.419 ENCOUNTER FOR GYNECOLOGICAL EXAMINATION (GENERAL) (ROUTINE) WITHOUT ABNORMAL FINDINGS: ICD-10-CM

## 2025-01-22 LAB — HGB BLD-MCNC: 14.3 G/DL (ref 11.7–15.7)

## 2025-01-22 PROCEDURE — 84443 ASSAY THYROID STIM HORMONE: CPT

## 2025-01-22 PROCEDURE — 85018 HEMOGLOBIN: CPT | Mod: ORL

## 2025-01-22 PROCEDURE — 83036 HEMOGLOBIN GLYCOSYLATED A1C: CPT | Mod: ORL

## 2025-01-22 PROCEDURE — 80053 COMPREHEN METABOLIC PANEL: CPT

## 2025-01-22 PROCEDURE — 80061 LIPID PANEL: CPT | Mod: ORL

## 2025-01-22 PROCEDURE — 82310 ASSAY OF CALCIUM: CPT

## 2025-01-22 PROCEDURE — 84155 ASSAY OF PROTEIN SERUM: CPT

## 2025-01-23 LAB
ALBUMIN SERPL BCG-MCNC: 4.4 G/DL (ref 3.5–5.2)
ALP SERPL-CCNC: 86 U/L (ref 40–150)
ALT SERPL W P-5'-P-CCNC: 146 U/L (ref 0–50)
ANION GAP SERPL CALCULATED.3IONS-SCNC: 9 MMOL/L (ref 7–15)
AST SERPL W P-5'-P-CCNC: 103 U/L (ref 0–45)
BILIRUB SERPL-MCNC: 0.3 MG/DL
BUN SERPL-MCNC: 13.5 MG/DL (ref 6–20)
CALCIUM SERPL-MCNC: 9.7 MG/DL (ref 8.8–10.4)
CHLORIDE SERPL-SCNC: 105 MMOL/L (ref 98–107)
CHOLEST SERPL-MCNC: 204 MG/DL
CREAT SERPL-MCNC: 0.65 MG/DL (ref 0.51–0.95)
EGFRCR SERPLBLD CKD-EPI 2021: >90 ML/MIN/1.73M2
EST. AVERAGE GLUCOSE BLD GHB EST-MCNC: 105 MG/DL
FASTING STATUS PATIENT QL REPORTED: ABNORMAL
FASTING STATUS PATIENT QL REPORTED: ABNORMAL
GLUCOSE SERPL-MCNC: 75 MG/DL (ref 70–99)
HBA1C MFR BLD: 5.3 %
HCO3 SERPL-SCNC: 25 MMOL/L (ref 22–29)
HDLC SERPL-MCNC: 46 MG/DL
HOLD SPECIMEN: NORMAL
LDLC SERPL CALC-MCNC: 124 MG/DL
NONHDLC SERPL-MCNC: 158 MG/DL
POTASSIUM SERPL-SCNC: 4.7 MMOL/L (ref 3.4–5.3)
PROT SERPL-MCNC: 7.2 G/DL (ref 6.4–8.3)
SODIUM SERPL-SCNC: 139 MMOL/L (ref 135–145)
TRIGL SERPL-MCNC: 172 MG/DL
TSH SERPL DL<=0.005 MIU/L-ACNC: 1.1 UIU/ML (ref 0.3–4.2)

## 2025-02-04 ENCOUNTER — LAB REQUISITION (OUTPATIENT)
Dept: LAB | Facility: CLINIC | Age: 40
End: 2025-02-04
Payer: COMMERCIAL

## 2025-02-04 DIAGNOSIS — E78.5 HYPERLIPIDEMIA, UNSPECIFIED: ICD-10-CM

## 2025-02-04 DIAGNOSIS — R74.01 ELEVATION OF LEVELS OF LIVER TRANSAMINASE LEVELS: ICD-10-CM

## 2025-02-04 LAB
ALBUMIN SERPL BCG-MCNC: 4.5 G/DL (ref 3.5–5.2)
ALP SERPL-CCNC: 75 U/L (ref 40–150)
ALT SERPL W P-5'-P-CCNC: 28 U/L (ref 0–50)
ANION GAP SERPL CALCULATED.3IONS-SCNC: 13 MMOL/L (ref 7–15)
AST SERPL W P-5'-P-CCNC: 21 U/L (ref 0–45)
BILIRUB SERPL-MCNC: 0.4 MG/DL
BUN SERPL-MCNC: 13.8 MG/DL (ref 6–20)
CALCIUM SERPL-MCNC: 9.2 MG/DL (ref 8.8–10.4)
CHLORIDE SERPL-SCNC: 105 MMOL/L (ref 98–107)
CHOLEST SERPL-MCNC: 198 MG/DL
CREAT SERPL-MCNC: 0.71 MG/DL (ref 0.51–0.95)
EGFRCR SERPLBLD CKD-EPI 2021: >90 ML/MIN/1.73M2
FASTING STATUS PATIENT QL REPORTED: YES
FASTING STATUS PATIENT QL REPORTED: YES
GLUCOSE SERPL-MCNC: 94 MG/DL (ref 70–99)
HCO3 SERPL-SCNC: 22 MMOL/L (ref 22–29)
HDLC SERPL-MCNC: 54 MG/DL
LDLC SERPL CALC-MCNC: 129 MG/DL
NONHDLC SERPL-MCNC: 144 MG/DL
POTASSIUM SERPL-SCNC: 4.3 MMOL/L (ref 3.4–5.3)
PROT SERPL-MCNC: 7.3 G/DL (ref 6.4–8.3)
SODIUM SERPL-SCNC: 140 MMOL/L (ref 135–145)
TRIGL SERPL-MCNC: 77 MG/DL

## 2025-02-04 PROCEDURE — 82247 BILIRUBIN TOTAL: CPT

## 2025-02-04 PROCEDURE — 80061 LIPID PANEL: CPT | Mod: ORL

## 2025-02-12 ENCOUNTER — HOSPITAL ENCOUNTER (OUTPATIENT)
Dept: ULTRASOUND IMAGING | Facility: HOSPITAL | Age: 40
Discharge: HOME | End: 2025-02-12
Payer: COMMERCIAL

## 2025-02-12 DIAGNOSIS — R10.11 RIGHT UPPER QUADRANT PAIN: ICD-10-CM

## 2025-02-12 PROCEDURE — 76705 ECHO EXAM OF ABDOMEN: CPT
